# Patient Record
Sex: MALE | Race: WHITE | NOT HISPANIC OR LATINO
[De-identification: names, ages, dates, MRNs, and addresses within clinical notes are randomized per-mention and may not be internally consistent; named-entity substitution may affect disease eponyms.]

---
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
## 2024-02-04 DIAGNOSIS — Z87.438 PERSONAL HISTORY OF OTHER DISEASES OF MALE GENITAL ORGANS: ICD-10-CM

## 2024-02-12 ENCOUNTER — APPOINTMENT (OUTPATIENT)
Dept: PEDIATRICS | Facility: CLINIC | Age: 19
End: 2024-02-12
Payer: MEDICAID

## 2024-02-12 VITALS
BODY MASS INDEX: 24.53 KG/M2 | WEIGHT: 165.6 LBS | SYSTOLIC BLOOD PRESSURE: 131 MMHG | TEMPERATURE: 98.6 F | HEIGHT: 69 IN | DIASTOLIC BLOOD PRESSURE: 76 MMHG

## 2024-02-12 DIAGNOSIS — L90.6 STRIAE ATROPHICAE: ICD-10-CM

## 2024-02-12 DIAGNOSIS — L30.9 DERMATITIS, UNSPECIFIED: ICD-10-CM

## 2024-02-12 DIAGNOSIS — Z00.121 ENCOUNTER FOR ROUTINE CHILD HEALTH EXAMINATION WITH ABNORMAL FINDINGS: ICD-10-CM

## 2024-02-12 DIAGNOSIS — R55 SYNCOPE AND COLLAPSE: ICD-10-CM

## 2024-02-12 DIAGNOSIS — E63.9 NUTRITIONAL DEFICIENCY, UNSPECIFIED: ICD-10-CM

## 2024-02-12 PROCEDURE — 96160 PT-FOCUSED HLTH RISK ASSMT: CPT | Mod: 59

## 2024-02-12 PROCEDURE — 90472 IMMUNIZATION ADMIN EACH ADD: CPT

## 2024-02-12 PROCEDURE — 90471 IMMUNIZATION ADMIN: CPT

## 2024-02-12 PROCEDURE — 99395 PREV VISIT EST AGE 18-39: CPT | Mod: 25

## 2024-02-12 PROCEDURE — 90620 MENB-4C VACCINE IM: CPT

## 2024-02-12 PROCEDURE — 90715 TDAP VACCINE 7 YRS/> IM: CPT

## 2024-02-12 PROCEDURE — 99173 VISUAL ACUITY SCREEN: CPT | Mod: 59

## 2024-02-12 RX ORDER — HYDROCORTISONE 10 MG/G
1 CREAM TOPICAL TWICE DAILY
Qty: 1 | Refills: 0 | Status: ACTIVE | COMMUNITY
Start: 2024-02-12 | End: 1900-01-01

## 2024-02-12 RX ORDER — VALACYCLOVIR 1 G/1
1 TABLET, FILM COATED ORAL
Qty: 8 | Refills: 2 | Status: ACTIVE | COMMUNITY
Start: 2021-05-05 | End: 1900-01-01

## 2024-02-12 NOTE — PHYSICAL EXAM
[Alert] : alert [No Acute Distress] : no acute distress [Normocephalic] : normocephalic [EOMI Bilateral] : EOMI bilateral [Clear tympanic membranes with bony landmarks and light reflex present bilaterally] : clear tympanic membranes with bony landmarks and light reflex present bilaterally  [Pink Nasal Mucosa] : pink nasal mucosa [Nonerythematous Oropharynx] : nonerythematous oropharynx [Supple, full passive range of motion] : supple, full passive range of motion [No Palpable Masses] : no palpable masses [Clear to Auscultation Bilaterally] : clear to auscultation bilaterally [Regular Rate and Rhythm] : regular rate and rhythm [Normal S1, S2 audible] : normal S1, S2 audible [No Murmurs] : no murmurs [+2 Femoral Pulses] : +2 femoral pulses [Soft] : soft [NonTender] : non tender [Non Distended] : non distended [Normoactive Bowel Sounds] : normoactive bowel sounds [No Hepatomegaly] : no hepatomegaly [No Splenomegaly] : no splenomegaly [Son: ____] : Son [unfilled] [No Abnormal Lymph Nodes Palpated] : no abnormal lymph nodes palpated [Normal Muscle Tone] : normal muscle tone [No Gait Asymmetry] : no gait asymmetry [No pain or deformities with palpation of bone, muscles, joints] : no pain or deformities with palpation of bone, muscles, joints [Straight] : straight [No Scoliosis] : no scoliosis [+2 Patella DTR] : +2 patella DTR [Cranial Nerves Grossly Intact] : cranial nerves grossly intact [No Rash or Lesions] : no rash or lesions [Son: _____] : Son [unfilled] [Circumcised] : circumcised [Bilateral descended testes] : bilateral descended testes [No Testicular Masses] : no testicular masses [FreeTextEntry5] : RR ++ LR= [FreeTextEntry8] : RRno murmur [FreeTextEntry6] : nl [de-identified] : striae abd, arms legs. Dry areas arms. dark circles under eyes

## 2024-02-12 NOTE — DISCUSSION/SUMMARY
[] : The components of the vaccine(s) to be administered today are listed in the plan of care. The disease(s) for which the vaccine(s) are intended to prevent and the risks have been discussed with the caretaker.  The risks are also included in the appropriate vaccination information statements which have been provided to the patient's caregiver.  The caregiver has given consent to vaccinate. [FreeTextEntry1] : Well adult, age 18.  Skin issues - striae on body, poss from wt gain lately.  eczema. dark circles under eyes, not just effect of light, skin darker than rest juanjose L eye.   To cardiology as fainted once.  Nutritionist for self described poor diet Ortho most urgent for painful arms - stop any activity that causes pain for now.  valtrex in case gets HSV lip sore.    Discussed safety issues. Dangers of substance abuse. Resisting peer pressure.  Internet, computer precautions, safety.  5210 reviewed, healthy eating.  Staying safe.  If situation makes them uncomfortable get right out of it and tell a trusted adult, if needs help come to see us. Always wear a seat belt. Helmet for biking, skiing, scooters.

## 2024-02-12 NOTE — HISTORY OF PRESENT ILLNESS
[FreeTextEntry1] : 18 yr old.  electrical trade school graduate.   does not like it. Trade school.  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   Fainted once 3 yrs ago, in [employer] ( thinks was hungry).   Re ADHD - fidgety , shakes legs.  No trouble concentrating, does not want meds.   Trouble falling asleep (music plays in his head), usually when slept late in that morning eg till 1 pm.   Episode of sleep paralysis - 12/23 once. Awake but could not move body, on waking up. Reviewed online and disc with pt, reassured. Denies depn. If recurs let me know.   Main reason for visit - Bilat elbow or triceps pain with arm wrestling, throwing football. Had this pain on and off all year, after straining arm or shoulder. Last few days R arm above elbow very painful, needed motrin.  Work out in gym.   Denies cigarettes, JUUL,  ETOH,THC, drug abuse. Denies depression, anxiety, suicidal ideation or act.  Not sexually active, was in past, not since I saw him last and labs done. No molestation, abuse, bullying. No cyber bullying.  No cutting, no eating disorder symptoms. Cis gender identity, attracted to opposite gender.

## 2024-02-14 ENCOUNTER — APPOINTMENT (OUTPATIENT)
Dept: PEDIATRICS | Facility: CLINIC | Age: 19
End: 2024-02-14

## 2024-03-01 ENCOUNTER — APPOINTMENT (OUTPATIENT)
Dept: ORTHOPEDIC SURGERY | Facility: CLINIC | Age: 19
End: 2024-03-01

## 2024-03-25 ENCOUNTER — APPOINTMENT (OUTPATIENT)
Dept: DERMATOLOGY | Facility: CLINIC | Age: 19
End: 2024-03-25

## 2024-03-26 ENCOUNTER — APPOINTMENT (OUTPATIENT)
Dept: DERMATOLOGY | Facility: CLINIC | Age: 19
End: 2024-03-26

## 2024-05-10 ENCOUNTER — APPOINTMENT (OUTPATIENT)
Dept: ORTHOPEDIC SURGERY | Facility: CLINIC | Age: 19
End: 2024-05-10
Payer: MEDICAID

## 2024-05-10 VITALS — HEIGHT: 69 IN | WEIGHT: 195 LBS | BODY MASS INDEX: 28.88 KG/M2

## 2024-05-10 DIAGNOSIS — M62.838 OTHER MUSCLE SPASM: ICD-10-CM

## 2024-05-10 DIAGNOSIS — M54.12 RADICULOPATHY, CERVICAL REGION: ICD-10-CM

## 2024-05-10 PROCEDURE — 99204 OFFICE O/P NEW MOD 45 MIN: CPT

## 2024-05-10 PROCEDURE — 72040 X-RAY EXAM NECK SPINE 2-3 VW: CPT

## 2024-05-10 NOTE — ADDENDUM
[FreeTextEntry1] : I, Mili Wattersleilanikellen, acted solely as a scribe for Dr. Rigoberto Vidal on this date 05/10/2024.  All medical record entries made by the Scribe were at my, Dr. Rigoberto Vidal, direction and personally dictated by me on 05/10/2024. I have reviewed the chart and agree that the record accurately reflects my personal performance of the history, physical exam, assessment and plan. I have also personally directed, reviewed, and agreed with the chart.
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
## 2024-05-10 NOTE — REASON FOR VISIT
[Initial Visit] : an initial visit for [Shoulder Pain] : shoulder pain [FreeTextEntry2] : b/l shoulder pain

## 2024-05-10 NOTE — DISCUSSION/SUMMARY
[de-identified] : The underlying pathophysiology was reviewed in great detail with the patient as well as the various treatment options, including ice, analgesics, NSAIDs, Physical therapy, steroid injections.   I recommended a course of physical therapy for the neck to work on strengthening and posture. A script was provided today.  Activity modifications and restrictions were discussed. I advised avoiding overhead lifting. I advised the patient to work on good posture.   A home exercise sheet was given and discussed with the patient to follow.  FU in 6 weeks if pain persists.   All questions were answered, all alternatives discussed and the patient is in complete agreement with that plan. Follow-up appointment as instructed. Any issues and the patient will call or come in sooner.  Karen THRASHERR

## 2024-05-10 NOTE — PHYSICAL EXAM
[Normal RUE] : Right Upper Extremity: No scars, rashes, lesions, ulcers, skin intact [Normal LUE] : Left Upper Extremity: No scars, rashes, lesions, ulcers, skin intact [Normal RLE] : Right Lower Extremity: No scars, rashes, lesions, ulcers, skin intact [Normal LLE] : Left Lower Extremity: No scars, rashes, lesions, ulcers, skin intact [Normal Touch] : sensation intact for touch [Normal] : Oriented to person, place, and time, insight and judgement were intact and the affect was normal [de-identified] : Cervical Spine/Neck Inspection/Palpation :  Inspection : alignment midline, decreased degree of lordosis present  Skin : normal appearance, no masses, no tenderness, trachea midline  Palpation : musculature is nontender to palpation  Tests and Signs : Spurlings (-), Lhermittes (-)  Range of Motion : arc of motion full in all planes, no crepitus or pain with ROM  Stability : no subluxations or other evidence of instability demonstrated during range of motion testing o Muscle Strength : paraspinal muscle strength within normal limits o Muscle Tone : paraspinal muscle tone within normal limits o Muscle Bulk : normal, no atrophy o Cervical Lymph Nodes : no lymphadenopathy present   Right Upper Extremity o Shoulder :  Inspection/Palpation : nontender to the greater tuberosity, no swelling, no deformities  Range of Motion : ACTIVE FORWARD ELEVATION: Measured at 160 degrees, ACTIVE EXTERNAL ROTATION: Measured at 90 degrees, ACTIVE INTERNAL ROTATION: Measured at T7 degrees  Strength : external rotation 5/5, internal rotation 5/5, supraspinatus 5/5  Stability : no joint instability on provocative testing  Tests/Signs : Neer (-), Hernandez (-), Apprehension (-) o Upper Arm : no tenderness, no swelling, no deformities o Muscle Bulk : no atrophy o Sensation : sensation intact to light touch o Skin : no skin rash or discoloration o Vascular Exam : no edema, no cyanosis, radial and ulnar pulses normal  Left Upper Extremity o Shoulder :  Inspection/Palpation : nontender to the greater tuberosity, no swelling, no deformities  Range of Motion : ACTIVE FORWARD ELEVATION: Measured at 160 degrees, ACTIVE EXTERNAL ROTATION: Measured at 90 degrees, ACTIVE INTERNAL ROTATION: Measured at T7  Strength : external rotation 5/5, internal rotation 5/5, supraspinatus 5/5  Stability : no joint instability on provocative testing  Tests/Signs : Neer (-), Hernandez (-), Apprehension (-) o Upper Arm : no tenderness, no swelling, no deformities o Muscle Bulk : no atrophy o Sensation : sensation intact to light touch o Skin : no skin rash or discoloration o Vascular Exam : no edema, no cyanosis, radial and ulnar pulses normal [de-identified] : o Cervical Spine : AP, lateral, and oblique views were obtained, there are no soft tissue abnormalities, no fractures, loss of the cervical lordosis, disc spaces normal, foraminae within normal limits, normal bone density, no bony lesions.

## 2024-05-10 NOTE — END OF VISIT
[1. Privacy issue, precluded by [facility] or Federal Law] : Reason - Privacy issue, precluded by [facility] or Federal Law

## 2024-05-13 ENCOUNTER — APPOINTMENT (OUTPATIENT)
Dept: PEDIATRICS | Facility: CLINIC | Age: 19
End: 2024-05-13
Payer: MEDICAID

## 2024-05-13 DIAGNOSIS — M79.622 PAIN IN RIGHT UPPER ARM: ICD-10-CM

## 2024-05-13 DIAGNOSIS — M79.621 PAIN IN RIGHT UPPER ARM: ICD-10-CM

## 2024-05-13 DIAGNOSIS — R76.8 OTHER SPECIFIED ABNORMAL IMMUNOLOGICAL FINDINGS IN SERUM: ICD-10-CM

## 2024-05-13 DIAGNOSIS — L81.9 DISORDER OF PIGMENTATION, UNSPECIFIED: ICD-10-CM

## 2024-05-13 LAB
ALBUMIN SERPL ELPH-MCNC: 4.8 G/DL
ALP BLD-CCNC: 92 U/L
ALT SERPL-CCNC: 26 U/L
ANION GAP SERPL CALC-SCNC: 12 MMOL/L
AST SERPL-CCNC: 20 U/L
BILIRUB SERPL-MCNC: 0.7 MG/DL
BUN SERPL-MCNC: 13 MG/DL
CALCIUM SERPL-MCNC: 9.6 MG/DL
CHLORIDE SERPL-SCNC: 104 MMOL/L
CHOLEST SERPL-MCNC: 157 MG/DL
CO2 SERPL-SCNC: 26 MMOL/L
CREAT SERPL-MCNC: 0.82 MG/DL
EGFR: 129 ML/MIN/1.73M2
GLUCOSE SERPL-MCNC: 98 MG/DL
HCT VFR BLD CALC: 43.9 %
HDLC SERPL-MCNC: 61 MG/DL
HGB BLD-MCNC: 15.4 G/DL
IRON SERPL-MCNC: 108 UG/DL
LDLC SERPL CALC-MCNC: 82 MG/DL
MCHC RBC-ENTMCNC: 30.3 PG
MCHC RBC-ENTMCNC: 35.1 GM/DL
MCV RBC AUTO: 86.4 FL
NONHDLC SERPL-MCNC: 96 MG/DL
PLATELET # BLD AUTO: 206 K/UL
POTASSIUM SERPL-SCNC: 4.3 MMOL/L
PROT SERPL-MCNC: 7.1 G/DL
RBC # BLD: 5.08 M/UL
RBC # FLD: 12.6 %
SODIUM SERPL-SCNC: 141 MMOL/L
TRIGL SERPL-MCNC: 71 MG/DL
TSH SERPL-ACNC: 1.51 UIU/ML
WBC # FLD AUTO: 8.67 K/UL

## 2024-05-13 PROCEDURE — 99212 OFFICE O/P EST SF 10 MIN: CPT | Mod: 25

## 2024-05-13 NOTE — HISTORY OF PRESENT ILLNESS
[FreeTextEntry6] : Patient for blood tests.  Not fasting , does not want to return fasting as took time off from work.  Reviewed sex history, STD labs negative last time labs done. Pt does not want them now, has not been sex active since that time he says.  HIV, STD tests offered and refused.

## 2024-05-13 NOTE — DISCUSSION/SUMMARY
[FreeTextEntry1] : For labs, history reviewed.   Ortho saw pt 5/10/24 , note and advice reviewed with christophe.  Advised arrange PT soon.  Arm pain from neck , xray showed a straight neck that should have curve acc to pt, saw xray on his phone.

## 2024-05-15 ENCOUNTER — APPOINTMENT (OUTPATIENT)
Dept: PEDIATRICS | Facility: CLINIC | Age: 19
End: 2024-05-15
Payer: MEDICAID

## 2024-05-15 DIAGNOSIS — Z01.84 ENCOUNTER FOR ANTIBODY RESPONSE EXAMINATION: ICD-10-CM

## 2024-05-15 LAB
HBV SURFACE AB SERPL IA-ACNC: 7.6 MIU/ML
MEV IGG FLD QL IA: 115 AU/ML
MEV IGG+IGM SER-IMP: POSITIVE
RUBV IGG FLD-ACNC: 18.4 INDEX
RUBV IGG SER-IMP: POSITIVE

## 2024-05-15 PROCEDURE — 99213 OFFICE O/P EST LOW 20 MIN: CPT

## 2024-05-15 NOTE — HISTORY OF PRESENT ILLNESS
[FreeTextEntry6] : Pt here for PPD and review of labs.  Required for his work application.  MMRV immune Hep B not immune.

## 2024-05-15 NOTE — DISCUSSION/SUMMARY
[FreeTextEntry1] : PPD placed L forearm.   Reviewed labs.   Reviewed TB questions and helped him fill out med form.   Ordered Hep B adult vaccine - rtn when get it.   Rtn in 2 d for PPD reading.  Unclear if tetanus and diphtheria abs wanted by work - labs added.

## 2024-05-17 ENCOUNTER — APPOINTMENT (OUTPATIENT)
Dept: PEDIATRICS | Facility: CLINIC | Age: 19
End: 2024-05-17
Payer: MEDICAID

## 2024-05-17 VITALS — TEMPERATURE: 97.9 F

## 2024-05-17 DIAGNOSIS — Z11.1 ENCOUNTER FOR SCREENING FOR RESPIRATORY TUBERCULOSIS: ICD-10-CM

## 2024-05-17 DIAGNOSIS — Z23 ENCOUNTER FOR IMMUNIZATION: ICD-10-CM

## 2024-05-17 PROCEDURE — ZZZZZ: CPT

## 2024-05-17 NOTE — DISCUSSION/SUMMARY
[FreeTextEntry1] : left forearm PPD neg Hep B  [] : The components of the vaccine(s) to be administered today are listed in the plan of care. The disease(s) for which the vaccine(s) are intended to prevent and the risks have been discussed with the caretaker.  The risks are also included in the appropriate vaccination information statements which have been provided to the patient's caregiver.  The caregiver has given consent to vaccinate.

## 2024-05-24 LAB
C DIPHTHERIAE AB SER QL: 1.72 IU/ML
C TETANI IGG SER-ACNC: 5.46 IU/ML

## 2024-06-17 LAB
AMPHET UR-MCNC: NEGATIVE NG/ML
BARBITURATES UR-MCNC: NEGATIVE NG/ML
BENZODIAZ UR-MCNC: NEGATIVE NG/ML
COCAINE METAB.OTHER UR-MCNC: NEGATIVE NG/ML
CREATININE, URINE: 242.2 MG/DL
FENTANYL, URINE: NEGATIVE NG/ML
METHADONE SCREEN, UR: NEGATIVE NG/ML
OPIATES UR-MCNC: NEGATIVE NG/ML
OXYCODONE/OXYMORPHONE, URINE: NEGATIVE NG/ML
PCP UR-MCNC: NEGATIVE NG/ML
PH, URINE: 7
THC UR QL: NEGATIVE NG/ML

## 2024-07-19 ENCOUNTER — APPOINTMENT (OUTPATIENT)
Dept: ORTHOPEDIC SURGERY | Facility: CLINIC | Age: 19
End: 2024-07-19

## 2024-07-29 ENCOUNTER — APPOINTMENT (OUTPATIENT)
Dept: PEDIATRICS | Facility: CLINIC | Age: 19
End: 2024-07-29
Payer: MEDICAID

## 2024-07-29 VITALS — TEMPERATURE: 98.1 F | WEIGHT: 188.6 LBS

## 2024-07-29 DIAGNOSIS — K59.00 CONSTIPATION, UNSPECIFIED: ICD-10-CM

## 2024-07-29 DIAGNOSIS — K59.09 OTHER CONSTIPATION: ICD-10-CM

## 2024-07-29 DIAGNOSIS — R10.9 UNSPECIFIED ABDOMINAL PAIN: ICD-10-CM

## 2024-07-29 PROCEDURE — G2211 COMPLEX E/M VISIT ADD ON: CPT | Mod: NC,1L

## 2024-07-29 PROCEDURE — 99214 OFFICE O/P EST MOD 30 MIN: CPT

## 2024-07-29 RX ORDER — POLYETHYLENE GLYCOL 3350 17 G/17G
17 POWDER, FOR SOLUTION ORAL DAILY
Qty: 1 | Refills: 2 | Status: ACTIVE | COMMUNITY
Start: 2024-07-29 | End: 1900-01-01

## 2024-07-29 NOTE — DISCUSSION/SUMMARY
[FreeTextEntry1] : constipation/abdominal pain Explained concerns re possible appendicitis vs constipation. Pt may take miralax explained that does not work immediately and may take a few days to achieve daily bm.  If abdominal pain increases over next 24 hrs or spikes fever or other associated symptoms to go to ER immediately F/u phone call at 2PM pt feeling slightly better and had a bm in the interim.  Will continue to monitor symptoms time spne 30 min. including indepth discussion re chronic constipation and tx

## 2024-07-29 NOTE — HISTORY OF PRESENT ILLNESS
[FreeTextEntry6] : c/o constipation and pain in groin area. BM q 2-3 days but not complete. filling pain in abdomen. and wonders if its the constipation feeling.  now feeling Right lower quadrant pain with hopping no fever.

## 2024-07-29 NOTE — PHYSICAL EXAM
[NL] : warm, clear [Soft] : soft [Tender] : nontender [Distended] : nondistended [Normal Bowel Sounds] : normal bowel sounds [Psoas Sign Positive] : psoas sign negative [Normal external genitalia] : normal external genitalia [FreeTextEntry9] : plus minus right lower quadrant pain, some discomfort with hopping neg psoas sign  [FreeTextEntry6] : nml exam no hernia (MA in attendance during exam)

## 2024-07-30 ENCOUNTER — APPOINTMENT (OUTPATIENT)
Dept: PEDIATRICS | Facility: CLINIC | Age: 19
End: 2024-07-30
Payer: MEDICAID

## 2024-07-30 PROCEDURE — 99442: CPT
